# Patient Record
Sex: FEMALE | Race: OTHER | HISPANIC OR LATINO
[De-identification: names, ages, dates, MRNs, and addresses within clinical notes are randomized per-mention and may not be internally consistent; named-entity substitution may affect disease eponyms.]

---

## 2018-02-09 PROBLEM — Z00.00 ENCOUNTER FOR PREVENTIVE HEALTH EXAMINATION: Status: ACTIVE | Noted: 2018-02-09

## 2018-03-08 ENCOUNTER — APPOINTMENT (OUTPATIENT)
Dept: ENDOCRINOLOGY | Facility: CLINIC | Age: 63
End: 2018-03-08
Payer: MEDICAID

## 2018-03-08 VITALS
HEIGHT: 61 IN | HEART RATE: 75 BPM | BODY MASS INDEX: 25.86 KG/M2 | SYSTOLIC BLOOD PRESSURE: 124 MMHG | DIASTOLIC BLOOD PRESSURE: 66 MMHG | WEIGHT: 137 LBS

## 2018-03-08 DIAGNOSIS — M19.90 UNSPECIFIED OSTEOARTHRITIS, UNSPECIFIED SITE: ICD-10-CM

## 2018-03-08 DIAGNOSIS — N20.0 CALCULUS OF KIDNEY: ICD-10-CM

## 2018-03-08 DIAGNOSIS — Z80.9 FAMILY HISTORY OF MALIGNANT NEOPLASM, UNSPECIFIED: ICD-10-CM

## 2018-03-08 PROCEDURE — 36415 COLL VENOUS BLD VENIPUNCTURE: CPT

## 2018-03-08 PROCEDURE — 99214 OFFICE O/P EST MOD 30 MIN: CPT | Mod: 25

## 2018-03-15 ENCOUNTER — FORM ENCOUNTER (OUTPATIENT)
Age: 63
End: 2018-03-15

## 2018-03-16 ENCOUNTER — APPOINTMENT (OUTPATIENT)
Dept: ULTRASOUND IMAGING | Facility: HOSPITAL | Age: 63
End: 2018-03-16

## 2018-03-16 ENCOUNTER — OUTPATIENT (OUTPATIENT)
Dept: OUTPATIENT SERVICES | Facility: HOSPITAL | Age: 63
LOS: 1 days | End: 2018-03-16
Payer: COMMERCIAL

## 2018-03-16 PROCEDURE — 76536 US EXAM OF HEAD AND NECK: CPT | Mod: 26

## 2018-03-16 PROCEDURE — 76536 US EXAM OF HEAD AND NECK: CPT

## 2018-03-23 ENCOUNTER — OTHER (OUTPATIENT)
Age: 63
End: 2018-03-23

## 2018-03-23 LAB
ALBUMIN SERPL ELPH-MCNC: 3.9 G/DL
ALP BLD-CCNC: 95 U/L
ALT SERPL-CCNC: 13 U/L
ANION GAP SERPL CALC-SCNC: 17 MMOL/L
AST SERPL-CCNC: 18 U/L
BILIRUB SERPL-MCNC: 0.4 MG/DL
BUN SERPL-MCNC: 18 MG/DL
CALCIUM SERPL-MCNC: 10.1 MG/DL
CHLORIDE SERPL-SCNC: 102 MMOL/L
CHOLEST SERPL-MCNC: 165 MG/DL
CHOLEST/HDLC SERPL: 2.7 RATIO
CO2 SERPL-SCNC: 23 MMOL/L
CREAT SERPL-MCNC: 0.76 MG/DL
CREAT SPEC-SCNC: 73 MG/DL
GLUCOSE SERPL-MCNC: 87 MG/DL
HBA1C MFR BLD HPLC: 5.8 %
HDLC SERPL-MCNC: 62 MG/DL
LDLC SERPL CALC-MCNC: 90 MG/DL
MICROALBUMIN 24H UR DL<=1MG/L-MCNC: 0.4 MG/DL
MICROALBUMIN/CREAT 24H UR-RTO: 5 MG/G
POTASSIUM SERPL-SCNC: 4.2 MMOL/L
PROT SERPL-MCNC: 8.1 G/DL
SODIUM SERPL-SCNC: 142 MMOL/L
TRIGL SERPL-MCNC: 63 MG/DL
TSH SERPL-ACNC: 0.02 UIU/ML

## 2018-04-13 ENCOUNTER — LABORATORY RESULT (OUTPATIENT)
Age: 63
End: 2018-04-13

## 2018-04-19 ENCOUNTER — APPOINTMENT (OUTPATIENT)
Dept: ENDOCRINOLOGY | Facility: CLINIC | Age: 63
End: 2018-04-19
Payer: MEDICAID

## 2018-04-19 VITALS
WEIGHT: 135 LBS | SYSTOLIC BLOOD PRESSURE: 117 MMHG | HEIGHT: 61 IN | BODY MASS INDEX: 25.49 KG/M2 | DIASTOLIC BLOOD PRESSURE: 65 MMHG | HEART RATE: 89 BPM

## 2018-04-19 PROCEDURE — 99213 OFFICE O/P EST LOW 20 MIN: CPT

## 2018-05-01 LAB
ALBUMIN SERPL ELPH-MCNC: 4.3 G/DL
ALP BLD-CCNC: 93 U/L
ALT SERPL-CCNC: 12 U/L
ANION GAP SERPL CALC-SCNC: 16 MMOL/L
AST SERPL-CCNC: 18 U/L
BILIRUB SERPL-MCNC: 0.3 MG/DL
BUN SERPL-MCNC: 13 MG/DL
CALCIUM SERPL-MCNC: 10 MG/DL
CHLORIDE SERPL-SCNC: 104 MMOL/L
CO2 SERPL-SCNC: 24 MMOL/L
CREAT SERPL-MCNC: 0.62 MG/DL
GLUCOSE SERPL-MCNC: 115 MG/DL
POTASSIUM SERPL-SCNC: 4.4 MMOL/L
PROT SERPL-MCNC: 7.7 G/DL
SODIUM SERPL-SCNC: 144 MMOL/L
T3FREE SERPL-MCNC: 5.79 PG/ML
T4 FREE SERPL-MCNC: 1.6 NG/DL
TSH RECEPTOR AB: 1.11 IU/L
TSH SERPL-ACNC: 0.01 UIU/ML

## 2018-05-09 ENCOUNTER — OTHER (OUTPATIENT)
Age: 63
End: 2018-05-09

## 2018-05-29 ENCOUNTER — FORM ENCOUNTER (OUTPATIENT)
Age: 63
End: 2018-05-29

## 2018-05-30 ENCOUNTER — OUTPATIENT (OUTPATIENT)
Dept: OUTPATIENT SERVICES | Facility: HOSPITAL | Age: 63
LOS: 1 days | End: 2018-05-30

## 2018-05-30 ENCOUNTER — APPOINTMENT (OUTPATIENT)
Dept: ULTRASOUND IMAGING | Facility: HOSPITAL | Age: 63
End: 2018-05-30
Payer: MEDICAID

## 2018-05-30 ENCOUNTER — FORM ENCOUNTER (OUTPATIENT)
Age: 63
End: 2018-05-30

## 2018-05-30 PROCEDURE — 76536 US EXAM OF HEAD AND NECK: CPT | Mod: 26

## 2018-05-31 PROCEDURE — 76536 US EXAM OF HEAD AND NECK: CPT

## 2018-05-31 PROCEDURE — A9516: CPT

## 2018-05-31 PROCEDURE — 78014 THYROID IMAGING W/BLOOD FLOW: CPT

## 2018-05-31 PROCEDURE — 78014 THYROID IMAGING W/BLOOD FLOW: CPT | Mod: 26

## 2018-06-26 ENCOUNTER — APPOINTMENT (OUTPATIENT)
Dept: ENDOCRINOLOGY | Facility: CLINIC | Age: 63
End: 2018-06-26
Payer: MEDICAID

## 2018-06-26 VITALS
SYSTOLIC BLOOD PRESSURE: 111 MMHG | BODY MASS INDEX: 29.45 KG/M2 | WEIGHT: 150 LBS | HEIGHT: 60 IN | HEART RATE: 71 BPM | DIASTOLIC BLOOD PRESSURE: 60 MMHG

## 2018-06-26 PROCEDURE — 99215 OFFICE O/P EST HI 40 MIN: CPT

## 2018-07-13 LAB
25(OH)D3 SERPL-MCNC: 21.1 NG/ML
ALBUMIN SERPL ELPH-MCNC: 4.3 G/DL
ALP BLD-CCNC: 109 U/L
ALT SERPL-CCNC: 12 U/L
ANION GAP SERPL CALC-SCNC: 13 MMOL/L
AST SERPL-CCNC: 14 U/L
BILIRUB SERPL-MCNC: 0.2 MG/DL
BUN SERPL-MCNC: 16 MG/DL
CALCIUM SERPL-MCNC: 9.4 MG/DL
CALCIUM SERPL-MCNC: 9.5 MG/DL
CHLORIDE SERPL-SCNC: 104 MMOL/L
CHOLEST SERPL-MCNC: 206 MG/DL
CHOLEST/HDLC SERPL: 2.5 RATIO
CO2 SERPL-SCNC: 25 MMOL/L
CREAT SERPL-MCNC: 0.76 MG/DL
GLUCOSE SERPL-MCNC: 88 MG/DL
HDLC SERPL-MCNC: 84 MG/DL
LDLC SERPL CALC-MCNC: 98 MG/DL
PARATHYROID HORMONE INTACT: 67 PG/ML
POTASSIUM SERPL-SCNC: 4.7 MMOL/L
PROT SERPL-MCNC: 7.4 G/DL
SODIUM SERPL-SCNC: 142 MMOL/L
T3FREE SERPL-MCNC: 3.74 PG/ML
T4 FREE SERPL-MCNC: 1.3 NG/DL
TRIGL SERPL-MCNC: 122 MG/DL
TSH SERPL-ACNC: 0.01 UIU/ML

## 2018-08-27 ENCOUNTER — APPOINTMENT (OUTPATIENT)
Dept: ENDOCRINOLOGY | Facility: CLINIC | Age: 63
End: 2018-08-27
Payer: MEDICAID

## 2018-08-27 VITALS — BODY MASS INDEX: 28.71 KG/M2 | WEIGHT: 147 LBS | DIASTOLIC BLOOD PRESSURE: 55 MMHG | SYSTOLIC BLOOD PRESSURE: 105 MMHG

## 2018-08-27 PROCEDURE — 99214 OFFICE O/P EST MOD 30 MIN: CPT

## 2018-12-17 ENCOUNTER — APPOINTMENT (OUTPATIENT)
Dept: ENDOCRINOLOGY | Facility: CLINIC | Age: 63
End: 2018-12-17
Payer: MEDICAID

## 2018-12-17 VITALS
WEIGHT: 152 LBS | DIASTOLIC BLOOD PRESSURE: 83 MMHG | HEIGHT: 60 IN | HEART RATE: 74 BPM | SYSTOLIC BLOOD PRESSURE: 131 MMHG | BODY MASS INDEX: 29.84 KG/M2

## 2018-12-17 PROCEDURE — 99215 OFFICE O/P EST HI 40 MIN: CPT

## 2019-01-28 LAB
25(OH)D3 SERPL-MCNC: 68.7 NG/ML
HBA1C MFR BLD HPLC: 6.3 %
T3FREE SERPL-MCNC: 3.15 PG/ML
T4 FREE SERPL-MCNC: 1.2 NG/DL
TSH SERPL-ACNC: 2.37 UIU/ML

## 2019-05-20 ENCOUNTER — APPOINTMENT (OUTPATIENT)
Dept: ENDOCRINOLOGY | Facility: CLINIC | Age: 64
End: 2019-05-20
Payer: MEDICAID

## 2019-05-20 VITALS
DIASTOLIC BLOOD PRESSURE: 73 MMHG | HEART RATE: 85 BPM | SYSTOLIC BLOOD PRESSURE: 136 MMHG | BODY MASS INDEX: 29.3 KG/M2 | WEIGHT: 150 LBS

## 2019-05-20 DIAGNOSIS — E55.9 VITAMIN D DEFICIENCY, UNSPECIFIED: ICD-10-CM

## 2019-05-20 PROCEDURE — 99214 OFFICE O/P EST MOD 30 MIN: CPT

## 2019-05-24 NOTE — END OF VISIT
[>50% of Time Spent on Counseling for ____] : Greater than 50% of the encounter time was spent on counseling for [unfilled] [FreeTextEntry3] : All medical record entries made by the scribe were at my, Dr. Marc Uribe's direction and personally dictated by me on 05/20/2019. I have reviewed the chart and agree that the record accurately reflects my personal performance of the history, physical exam, assessment and plan. I have also personally directed, reviewed, and agreed with the chart.\par \par  [Time Spent: ___ minutes] : I have spent [unfilled] minutes of face to face time with the patient

## 2019-05-24 NOTE — REVIEW OF SYSTEMS
[Recent Weight Gain (___ Lbs)] : recent [unfilled] ~Ulb weight gain [As Noted in HPI] : as noted in HPI [Negative] : Endocrine [Polyuria] : no polyuria [Nocturia] : no nocturia [FreeTextEntry8] : Denies osmotic diuresis [de-identified] : denies pins and needle sensation in lower extremities

## 2019-05-24 NOTE — ADDENDUM
[FreeTextEntry1] : I, Gladys Patton, acted solely as a scribe for Dr. Marc Uribe on 05/20/2019.\par I, Pam Arango, acted solely as a scribe for Dr. Marc Uribe on this date. 05/20/2019.\par

## 2019-05-24 NOTE — ASSESSMENT
[Carbohydrate Consistent Diet] : carbohydrate consistent diet [Importance of Diet and Exercise] : importance of diet and exercise to improve glycemic control, achieve weight loss and improve cardiovascular health [FreeTextEntry1] : 64 y/o F pt with:\par \par 1. Hx of pre-DM, with escalating A1c:\par Pt has a sedentary lifestyle, and continues to eat large food portions.\par \par 2. Hx of increased iPTH, with normal ca levels and no Hx of CKD:\par Pt has no FHx of hypercalcemia disorder and has Vitamin D insufficiency. \par Will send labs today to reassess increase levels of i PTH\par \par 3. Hx of thyroid nodules:\par Thyroid US shows inferior pole of L Thyroid with 0.6 cm nodule, described in GORDON uptake and scan as probable cold nodule. Will send for TFTs and consider a FNA biopsy.\par \par Return in 2 weeks. \par

## 2019-05-24 NOTE — HISTORY OF PRESENT ILLNESS
[FreeTextEntry1] : 7/31/13 A1c 6.6% \par 3/2018  TSH 0.02\par 4/13/18  tsh < 0.01, t4 free 1.8, free t3 6.83\par 4/19/18 TSH r ab 1.1, Free T4 1.6, Free T3 5.79 (1.8-4.6)\par 5/30/18 thyroid u/s: Left lobe : Inferior pole: 0.6 x 0.6 x 0.6 cm dense peripheral calcification \par 5/31/18  [facility]ON uptake and scan : 26% at 24 hours- considered as cold nodule, hypothyroidism \par 6/26/18 TSH 0.01, Free T4 1.3, Free T3 3.74, Vit D 25-OH 21.1, iPTH 67, Ca 9.5\par 1/12/19  A1c 6.3%, Vit D-25-OH 68.7, TSH 2.37, Free T4 1.2, Free T3 3.15, Cholesterol 206,  LDL-c 98 \par \par 62 y/o F pt, /73, BMI 29.3, with Hx of increased iPTH with normal calcium (since 7/2012), Hx of thyroid incidentaloma 0.6 cm (dx in 4/2013), thyroid nodules (dx ~ in 2014), and pre-DM. \par Other PMHx: osteoporosis w/ increased  A1c (in 7/2012), Vit D insufficiency\par PSHx: 2 kidney stone surgeries (in 2007)\par No FHx of hypercalcemia disorder\par \par Today pt presents for endocrine f/u, feeling well with no major physical complaints. Pt is asymptomatic. She notes she gained weight which she relates to her consumption of excessive calories. \par Pt denies osmotic diuresis, nocturia, polyuria, and pins and needle sensation in lower extremities.\par \par Current Meds: Ergocalciferol, Vit D3

## 2019-05-24 NOTE — PHYSICAL EXAM
[Alert] : alert [Normal Sclera/Conjunctiva] : normal sclera/conjunctiva [Normal Oropharynx] : the oropharynx was normal [No Respiratory Distress] : no respiratory distress [Clear to Auscultation] : lungs were clear to auscultation bilaterally [Pedal Pulses Normal] : the pedal pulses are present [No Edema] : there was no peripheral edema [Normal Bowel Sounds] : normal bowel sounds [Spine Straight] : spine straight [No Stigmata of Cushings Syndrome] : no stigmata of cushings syndrome [Normal Gait] : normal gait [Normal Strength/Tone] : muscle strength and tone were normal [No Rash] : no rash [Normal Reflexes] : deep tendon reflexes were 2+ and symmetric [Oriented x3] : oriented to person, place, and time [Normal Outer Ear/Nose] : the ears and nose were normal in appearance [Acanthosis Nigricans] : no acanthosis nigricans [de-identified] : R thyroid with nodularities [de-identified] : sinus normal

## 2019-07-01 ENCOUNTER — APPOINTMENT (OUTPATIENT)
Dept: ENDOCRINOLOGY | Facility: CLINIC | Age: 64
End: 2019-07-01
Payer: MEDICAID

## 2019-07-01 VITALS
DIASTOLIC BLOOD PRESSURE: 74 MMHG | BODY MASS INDEX: 29.1 KG/M2 | SYSTOLIC BLOOD PRESSURE: 125 MMHG | WEIGHT: 149 LBS | HEART RATE: 76 BPM

## 2019-07-01 DIAGNOSIS — R42 DIZZINESS AND GIDDINESS: ICD-10-CM

## 2019-07-01 LAB — GLUCOSE BLDC GLUCOMTR-MCNC: 123

## 2019-07-01 PROCEDURE — 99214 OFFICE O/P EST MOD 30 MIN: CPT | Mod: 25

## 2019-07-01 PROCEDURE — 82962 GLUCOSE BLOOD TEST: CPT

## 2019-07-01 RX ORDER — ERGOCALCIFEROL 1.25 MG/1
1.25 MG CAPSULE ORAL
Qty: 2 | Refills: 5 | Status: ACTIVE | COMMUNITY
Start: 2018-12-17 | End: 1900-01-01

## 2019-07-03 NOTE — ASSESSMENT
[Carbohydrate Consistent Diet] : carbohydrate consistent diet [Importance of Diet and Exercise] : importance of diet and exercise to improve glycemic control, achieve weight loss and improve cardiovascular health [FreeTextEntry1] : 62 y/o F pt with:\par 1. Hx of increase iPTH, with normal Ca levels:\par Likely 2/2 an early primary hyperparathyroidism vs secondary. Recommend pt continue Vit D supplementation.\par \par 2. Hx of thyroid nodules (dx in 2014):\par On 6/23/19 Thyroid US: L thyroid with a peripherally calcified nodule in L lower pole that measures 6 x 5 x 6mm.\par Refer pt for biopsy. \par \par 3. Hx of pre-DM, recent A1c of 6.3%:\par Today POCT was 123. Pt is not improving clinically, and is becoming sedentary 2/2 social issues. \par Pt declined Metformin.\par \par 4. Hx of low TSH levels:\par Pt's low TSH levels has normalized in 1/2019. Nuclear medicine done on 5/31/18, uptake and scan at 24 hours was 26%, homogeneous with significantly greater activity in L more than the R. Hashimoto thyroiditis? Will continue to monitor TFTs.\par  \par Return in 4 months.

## 2019-07-03 NOTE — ADDENDUM
[FreeTextEntry1] : I, Jasmeetcylde Arango, acted solely as a scribe for Dr. Marc Uribe on this date. 07/01/2019.\par

## 2019-07-03 NOTE — HISTORY OF PRESENT ILLNESS
[FreeTextEntry1] : 7/31/13 A1c 6.6% \par 3/2018  TSH 0.02\par 4/13/18  tsh < 0.01, t4 free 1.8, free t3 6.83\par 4/19/18 TSH r ab 1.1, Free T4 1.6, Free T3 5.79 (1.8-4.6)\par 5/30/18 thyroid u/s: Left lobe : Inferior pole: 0.6 x 0.6 x 0.6 cm dense peripheral calcification \par 5/31/18  [facility] uptake and scan : 26% at 24 hours- considered as cold nodule, hypothyroidism \par 6/26/18 TSH 0.01, Free T4 1.3, Free T3 3.74, Vit D 25-OH 21.1, iPTH 67, Ca 9.5\par 1/12/19  A1c 6.3%, Vit D 25-OH 68.7, TSH 2.37, Free T4 1.2, Free T3 3.15, Cholesterol 206,  LDL-c 98 \par 6/23/19 Thyroid US: L thyroid with a peripherally calcified nodule in L lower pole that measures 6 x 5 x 6mm.\par 6/25/19 s. creat 0.64, TSH 0.046, Vit D 25-OH 23.6, Free T3 3.3, Free T4 1.24, iPTH 74, Ca 9.7, Vit D 1-25-OH 46.9, \par \par 62 y/o F pt, /74, BMI 29.1, with Hx of increased iPTH with normal calcium (since 7/2012), Hx of thyroid incidentaloma 0.6 cm (dx in 4/2013), thyroid nodules (dx ~ in 2014), and pre-DM. \par Other PMHx: osteoporosis w/ increased  A1c (in 7/2012), Vit D insufficiency\par PSHx: 2 kidney stone surgeries (in 2007)\par No FHx of hypercalcemia disorder\par \par Today pt presents for endocrine f/u, feeling unwell with c/o positional vertigo x couple of weeks. Pt also c/o social issues, her  was diagnosed with CA.\par \par Current Meds: Ergocalciferol, Vit D3

## 2019-07-03 NOTE — PHYSICAL EXAM
[Alert] : alert [Normal Sclera/Conjunctiva] : normal sclera/conjunctiva [Normal Outer Ear/Nose] : the ears and nose were normal in appearance [Normal Oropharynx] : the oropharynx was normal [No Respiratory Distress] : no respiratory distress [Clear to Auscultation] : lungs were clear to auscultation bilaterally [Pedal Pulses Normal] : the pedal pulses are present [No Edema] : there was no peripheral edema [Normal Bowel Sounds] : normal bowel sounds [Spine Straight] : spine straight [No Stigmata of Cushings Syndrome] : no stigmata of cushings syndrome [Normal Gait] : normal gait [Normal Strength/Tone] : muscle strength and tone were normal [No Rash] : no rash [Normal Reflexes] : deep tendon reflexes were 2+ and symmetric [Oriented x3] : oriented to person, place, and time [Normal Rate] : heart rate was normal  [Normal S1, S2] : normal S1 and S2 [Acanthosis Nigricans] : no acanthosis nigricans [de-identified] : R and L thyroid lobes with nodularities

## 2019-07-03 NOTE — END OF VISIT
[FreeTextEntry3] : All medical record entries made by the Scribe were at my, Dr. Marc Uribe, direction and personally dictated by me on 07/01/2019. I have reviewed the chart and agree that the record accurately reflects my personal performance of the history, physical exam, assessment and plan. I have also personally directed, reviewed and agreed with the chart.\par  [>50% of Time Spent on Counseling for ____] : Greater than 50% of the encounter time was spent on counseling for [unfilled] [Time Spent: ___ minutes] : I have spent [unfilled] minutes of face to face time with the patient

## 2019-11-05 ENCOUNTER — APPOINTMENT (OUTPATIENT)
Dept: ENDOCRINOLOGY | Facility: CLINIC | Age: 64
End: 2019-11-05
Payer: MEDICAID

## 2019-11-05 VITALS
HEART RATE: 71 BPM | DIASTOLIC BLOOD PRESSURE: 78 MMHG | BODY MASS INDEX: 29.49 KG/M2 | SYSTOLIC BLOOD PRESSURE: 137 MMHG | WEIGHT: 151 LBS

## 2019-11-05 VITALS
HEIGHT: 63 IN | SYSTOLIC BLOOD PRESSURE: 136 MMHG | BODY MASS INDEX: 26.75 KG/M2 | DIASTOLIC BLOOD PRESSURE: 79 MMHG | HEART RATE: 79 BPM | WEIGHT: 151 LBS

## 2019-11-05 DIAGNOSIS — E05.90 THYROTOXICOSIS, UNSPECIFIED W/OUT THYROTOXIC CRISIS OR STORM: ICD-10-CM

## 2019-11-05 PROCEDURE — 99214 OFFICE O/P EST MOD 30 MIN: CPT | Mod: 25

## 2019-11-05 PROCEDURE — 82962 GLUCOSE BLOOD TEST: CPT

## 2019-11-07 PROBLEM — E05.90 HYPERTHYROIDISM: Status: ACTIVE | Noted: 2018-04-19

## 2019-11-07 NOTE — PHYSICAL EXAM
[Alert] : alert [Normal Sclera/Conjunctiva] : normal sclera/conjunctiva [Normal Outer Ear/Nose] : the ears and nose were normal in appearance [Normal Oropharynx] : the oropharynx was normal [No Respiratory Distress] : no respiratory distress [Clear to Auscultation] : lungs were clear to auscultation bilaterally [Normal Rate] : heart rate was normal  [Normal S1, S2] : normal S1 and S2 [Pedal Pulses Normal] : the pedal pulses are present [No Edema] : there was no peripheral edema [Normal Bowel Sounds] : normal bowel sounds [Spine Straight] : spine straight [No Stigmata of Cushings Syndrome] : no stigmata of cushings syndrome [Normal Gait] : normal gait [Normal Strength/Tone] : muscle strength and tone were normal [No Rash] : no rash [Normal Reflexes] : deep tendon reflexes were 2+ and symmetric [Oriented x3] : oriented to person, place, and time [Acanthosis Nigricans] : no acanthosis nigricans [de-identified] : R and L thyroid lobes with nodularities

## 2019-11-07 NOTE — ASSESSMENT
[Carbohydrate Consistent Diet] : carbohydrate consistent diet [Importance of Diet and Exercise] : importance of diet and exercise to improve glycemic control, achieve weight loss and improve cardiovascular health [FreeTextEntry1] : 63 y/o F pt with:\par 1. Hx of thyroid nodule with low TSH levels:\par Pt is clinically euthyroid. Will send for TFTs and thyroid US for 4/2020 (on 6/2019 Thyroid US shows a nodular area at the R upper pole which measures 6mm and a peripherally calcified nodule in L lower pole that measures 6mm).\par \par 2. Hx of pre-DM:\par Pt continues at an increased risk of developing DM. \par Recommend pt initiate 850 mg of Metformin QD.\par  \par Return in 4/2020.

## 2019-11-07 NOTE — END OF VISIT
[FreeTextEntry3] : All medical record entries made by the Scribe were at my, Dr. Marc Uribe, direction and personally dictated by me on 11/05/2019. I have reviewed the chart and agree that the record accurately reflects my personal performance of the history, physical exam, assessment and plan. I have also personally directed, reviewed and agreed with the chart.  [>50% of Time Spent on Counseling for ____] : Greater than 50% of the encounter time was spent on counseling for [unfilled] [Time Spent: ___ minutes] : I have spent [unfilled] minutes of face to face time with the patient

## 2019-11-07 NOTE — ADDENDUM
[FreeTextEntry1] : I, Pam Arango, acted solely as a scribe for Dr. Marc Uribe on this date. 11/05/2019.

## 2019-11-07 NOTE — HISTORY OF PRESENT ILLNESS
[FreeTextEntry1] : 7/31/13 A1c 6.6% \par 3/2018  TSH 0.02\par 4/13/18  tsh < 0.01, t4 free 1.8, free t3 6.83\par 4/19/18 TSH rab 1.1, Free T4 1.6, Free T3 5.79 (1.8-4.6)\par 5/30/18 thyroid u/s: Left lobe : Inferior pole: 0.6 x 0.6 x 0.6 cm dense peripheral calcification \par 5/31/18  [facility] uptake and scan : 26% at 24 hours- considered as cold nodule, hypothyroidism \par 6/26/18 TSH 0.01, Free T4 1.3, Free T3 3.74, Vit D 25-OH 21.1, iPTH 67, Ca 9.5\par 1/12/19  A1c 6.3%, Vit D 25-OH 68.7, TSH 2.37, Free T4 1.2, Free T3 3.15, Cholesterol 206,  LDL-c 98 \par 6/23/19 Thyroid US: L thyroid with a peripherally calcified nodule in L lower pole that measures 6 x 5 x 6mm.\par 6/25/19 s. creat 0.64, TSH 0.046, Vit D 25-OH 23.6, Free T3 3.3, Free T4 1.24, iPTH 74, Ca 9.7, Vit D 1-25-OH 46.9, \par \par 63 y/o F pt, /78, BMI 29.49, with Hx of increased iPTH with normal calcium (since 7/2012), Hx of thyroid incidentaloma 0.6 cm (dx in 4/2013), thyroid nodules (dx ~ in 2014), and pre-DM. \par Other PMHx: osteoporosis w/ increased  A1c (in 7/2012), Vit D insufficiency\par PSHx: 2 kidney stone surgeries (in 2007)\par No FHx of hypercalcemia disorder\par \par 7/1/19\par Today pt presents for endocrine f/u, feeling unwell with c/o positional vertigo x couple of weeks. Pt also c/o social issues, her  was diagnosed with CA.\par \par 11/4/19\par Today pt presents for endocrine f/u with POCT 116, feeling great with c/o occasional joint pains. \par Denies palpitations and symptoms of osmotic diuresis. \par \par Current Meds: Ergocalciferol, Vit D3

## 2019-11-07 NOTE — REVIEW OF SYSTEMS
[Negative] : Endocrine [As Noted in HPI] : as noted in HPI [Joint Pain] : joint pain [Palpitations] : no palpitations [de-identified] : denies symptoms of osmotic diuresis

## 2019-11-12 ENCOUNTER — APPOINTMENT (OUTPATIENT)
Dept: ENDOCRINOLOGY | Facility: CLINIC | Age: 64
End: 2019-11-12

## 2019-12-11 LAB
ESTIMATED AVERAGE GLUCOSE: 134 MG/DL
GLUCOSE BLDC GLUCOMTR-MCNC: 113
GLUCOSE BLDC GLUCOMTR-MCNC: 116
HBA1C MFR BLD HPLC: 6.3 %
T3FREE SERPL-MCNC: 2.86 PG/ML
T4 FREE SERPL-MCNC: 1 NG/DL
THYROGLOB AB SERPL-ACNC: <20 IU/ML
THYROPEROXIDASE AB SERPL IA-ACNC: 104 IU/ML
TSH SERPL-ACNC: 1.53 UIU/ML

## 2020-04-15 ENCOUNTER — APPOINTMENT (OUTPATIENT)
Dept: ENDOCRINOLOGY | Facility: CLINIC | Age: 65
End: 2020-04-15

## 2023-07-10 ENCOUNTER — OUTPATIENT (OUTPATIENT)
Dept: OUTPATIENT SERVICES | Facility: HOSPITAL | Age: 68
LOS: 1 days | End: 2023-07-10
Payer: MEDICAID

## 2023-07-10 ENCOUNTER — APPOINTMENT (OUTPATIENT)
Dept: MAMMOGRAPHY | Facility: IMAGING CENTER | Age: 68
End: 2023-07-10
Payer: MEDICAID

## 2023-07-10 ENCOUNTER — APPOINTMENT (OUTPATIENT)
Dept: ULTRASOUND IMAGING | Facility: IMAGING CENTER | Age: 68
End: 2023-07-10
Payer: MEDICAID

## 2023-07-10 DIAGNOSIS — Z00.8 ENCOUNTER FOR OTHER GENERAL EXAMINATION: ICD-10-CM

## 2023-07-10 PROCEDURE — G0279: CPT | Mod: 26

## 2023-07-10 PROCEDURE — 77066 DX MAMMO INCL CAD BI: CPT

## 2023-07-10 PROCEDURE — 76642 ULTRASOUND BREAST LIMITED: CPT | Mod: 26,LT

## 2023-07-10 PROCEDURE — 76642 ULTRASOUND BREAST LIMITED: CPT

## 2023-07-10 PROCEDURE — G0279: CPT

## 2023-07-10 PROCEDURE — 77066 DX MAMMO INCL CAD BI: CPT | Mod: 26

## 2023-07-18 ENCOUNTER — APPOINTMENT (OUTPATIENT)
Dept: ENDOCRINOLOGY | Facility: CLINIC | Age: 68
End: 2023-07-18
Payer: MEDICAID

## 2023-07-18 VITALS
HEART RATE: 75 BPM | DIASTOLIC BLOOD PRESSURE: 63 MMHG | HEIGHT: 63 IN | SYSTOLIC BLOOD PRESSURE: 136 MMHG | BODY MASS INDEX: 25.87 KG/M2 | WEIGHT: 146 LBS

## 2023-07-18 PROCEDURE — 99205 OFFICE O/P NEW HI 60 MIN: CPT | Mod: 25

## 2023-07-18 PROCEDURE — 82962 GLUCOSE BLOOD TEST: CPT

## 2023-07-18 NOTE — REASON FOR VISIT
[Initial Evaluation] : an initial evaluation [Thyroid nodule/ MNG] : thyroid nodule/ MNG [Other___] : [unfilled] [Family Member] : family member

## 2023-07-21 LAB
25(OH)D3 SERPL-MCNC: 29.3 NG/ML
ALBUMIN SERPL ELPH-MCNC: 4.3 G/DL
ALBUMIN SERPL ELPH-MCNC: 4.4 G/DL
ALP BLD-CCNC: 131 U/L
ALP BLD-CCNC: 132 U/L
ALT SERPL-CCNC: 17 U/L
ALT SERPL-CCNC: 19 U/L
ANION GAP SERPL CALC-SCNC: 11 MMOL/L
ANION GAP SERPL CALC-SCNC: 12 MMOL/L
APO B SERPL-MCNC: 92 MG/DL
AST SERPL-CCNC: 22 U/L
AST SERPL-CCNC: 25 U/L
BILIRUB SERPL-MCNC: 0.3 MG/DL
BILIRUB SERPL-MCNC: 0.3 MG/DL
BUN SERPL-MCNC: 12 MG/DL
BUN SERPL-MCNC: 12 MG/DL
CALCIUM SERPL-MCNC: 9.5 MG/DL
CALCIUM SERPL-MCNC: 9.5 MG/DL
CALCIUM SERPL-MCNC: 9.6 MG/DL
CHLORIDE SERPL-SCNC: 104 MMOL/L
CHLORIDE SERPL-SCNC: 105 MMOL/L
CHOLEST SERPL-MCNC: 214 MG/DL
CO2 SERPL-SCNC: 24 MMOL/L
CO2 SERPL-SCNC: 24 MMOL/L
CREAT SERPL-MCNC: 0.6 MG/DL
CREAT SERPL-MCNC: 0.61 MG/DL
EGFR: 98 ML/MIN/1.73M2
EGFR: 98 ML/MIN/1.73M2
ESTIMATED AVERAGE GLUCOSE: 177 MG/DL
GLUCOSE BLDC GLUCOMTR-MCNC: 155
HBA1C MFR BLD HPLC: 7.8 %
HDLC SERPL-MCNC: 77 MG/DL
LDLC SERPL CALC-MCNC: 106 MG/DL
NONHDLC SERPL-MCNC: 137 MG/DL
PARATHYROID HORMONE INTACT: 62 PG/ML
POTASSIUM SERPL-SCNC: 4.4 MMOL/L
POTASSIUM SERPL-SCNC: 4.5 MMOL/L
PROT SERPL-MCNC: 7 G/DL
PROT SERPL-MCNC: 7.1 G/DL
SODIUM SERPL-SCNC: 140 MMOL/L
SODIUM SERPL-SCNC: 141 MMOL/L
T4 FREE SERPL-MCNC: 1.2 NG/DL
THYROGLOB AB SERPL-ACNC: <20 IU/ML
THYROPEROXIDASE AB SERPL IA-ACNC: 319 IU/ML
TRIGL SERPL-MCNC: 184 MG/DL
TSH SERPL-ACNC: 0.02 UIU/ML

## 2023-07-21 NOTE — END OF VISIT
[FreeTextEntry3] : All medical record entries made by the Scribe were at my, Dr. Marc Uribe, direction and personally dictated by me on 07/18/2023. I have reviewed the chart and agree that the record accurately reflects my personal performance of the history, physical exam, assessment and plan. I have also personally, directed, reviewed and agreed with the chart  [Time Spent: ___ minutes] : I have spent [unfilled] minutes of time on the encounter.

## 2023-07-21 NOTE — ADDENDUM
[FreeTextEntry1] : I, Reina Paniagua, acted solely as a scribe for Dr. Marc Uribe on this date 07/18/2023

## 2023-07-21 NOTE — HISTORY OF PRESENT ILLNESS
[FreeTextEntry1] : 68 year old F pt, with Hx of Thyroid Nodule noted , pre-DM, and increased iPTH, self- referred presents today to establish endocrine care. \par Other PMHx:  Kidney Stones, Early menopause \par PSHx: Cholecystomy, tubal ligation, , \par FHx: Mother: T2DM, Father: Stomach Cancer, Siblings: 9 Sisters and Brother: No thyroid disorders, no thyroid Cancer. \par SHx: No etOH, Non smoker \par Menarche age 16, Menopause age 29. 4 kids youngest is 41   \par \par 2023 \par Pt  Is here for thyroid evaluation accompanied by her son. She states she needs a checkup as it has been 4 yrs since her last visit. Pt endorses feeling more tired than usual for the past few months. She was dx with sciatica that limited her movement. Pt has seen urogynecologist for urinary incontinence. \par \par Current Medications: None

## 2023-07-24 ENCOUNTER — NON-APPOINTMENT (OUTPATIENT)
Age: 68
End: 2023-07-24

## 2023-07-24 LAB — T3 SERPL-MCNC: 175 NG/DL

## 2023-08-15 ENCOUNTER — APPOINTMENT (OUTPATIENT)
Dept: ULTRASOUND IMAGING | Facility: IMAGING CENTER | Age: 68
End: 2023-08-15
Payer: MEDICAID

## 2023-08-15 ENCOUNTER — OUTPATIENT (OUTPATIENT)
Dept: OUTPATIENT SERVICES | Facility: HOSPITAL | Age: 68
LOS: 1 days | End: 2023-08-15
Payer: MEDICAID

## 2023-08-15 DIAGNOSIS — E04.1 NONTOXIC SINGLE THYROID NODULE: ICD-10-CM

## 2023-08-15 PROCEDURE — 76536 US EXAM OF HEAD AND NECK: CPT | Mod: 26

## 2023-08-15 PROCEDURE — 76536 US EXAM OF HEAD AND NECK: CPT

## 2023-08-25 LAB — TSH RECEPTOR AB: <1.1 IU/L

## 2023-08-29 ENCOUNTER — APPOINTMENT (OUTPATIENT)
Dept: ENDOCRINOLOGY | Facility: CLINIC | Age: 68
End: 2023-08-29
Payer: MEDICAID

## 2023-08-29 DIAGNOSIS — E78.00 PURE HYPERCHOLESTEROLEMIA, UNSPECIFIED: ICD-10-CM

## 2023-08-29 DIAGNOSIS — E34.9 ENDOCRINE DISORDER, UNSPECIFIED: ICD-10-CM

## 2023-08-29 PROCEDURE — 99214 OFFICE O/P EST MOD 30 MIN: CPT | Mod: 95

## 2023-08-30 NOTE — ADDENDUM
[FreeTextEntry1] : I, Reina Paniagua, acted solely as a scribe for Dr. Marc Uribe on this date 08/29/2023

## 2023-08-30 NOTE — END OF VISIT
[FreeTextEntry3] : All medical record entries made by the Scribe were at my, Dr. Marc Uribe, direction and personally dictated by me on 08/29/2023. I have reviewed the chart and agree that the record accurately reflects my personal performance of the history, physical exam, assessment and plan. I have also personally, directed, reviewed and agreed with the chart  [Time Spent: ___ minutes] : I have spent [unfilled] minutes of time on the encounter.

## 2023-08-30 NOTE — HISTORY OF PRESENT ILLNESS
[Home] : at home, [unfilled] , at the time of the visit. [Medical Office: ([facility])___] : at the medical office located in  [FreeTextEntry1] : 68 year old F pt, with Hx of Thyroid Nodule noted , pre-DM, and increased iPTH, self- referred presents today to establish endocrine care.  Other PMHx:  Kidney Stones, Early menopause  PSHx: Cholecystomy, tubal ligation, ,  FHx: Mother: T2DM, Father: Stomach Cancer, Siblings: 9 Sisters and Brother: No thyroid disorders, no thyroid Cancer.  SHx: No etOH, Non smoker  Menarche age 16, Menopause age 29. 4 kids youngest is 41     2023  Pt  Is here for thyroid evaluation accompanied by her son. She states she needs a checkup as it has been 4 yrs since her last visit. Pt endorses feeling more tired than usual for the past few months. She was dx with sciatica that limited her movement. Pt has seen urogynecologist for urinary incontinence.   2023 Pt did not have any questions prior to the initiation of the telehealth visit.  She presents today for lab reports and recommendations.  Pt feels well with no complaints.   Current Medications: None    Medication modified/added this visit: Multivitamins.

## 2024-06-20 ENCOUNTER — APPOINTMENT (OUTPATIENT)
Dept: ENDOCRINOLOGY | Facility: CLINIC | Age: 69
End: 2024-06-20
Payer: MEDICARE

## 2024-06-20 VITALS
WEIGHT: 142 LBS | SYSTOLIC BLOOD PRESSURE: 143 MMHG | BODY MASS INDEX: 25.16 KG/M2 | HEIGHT: 63 IN | DIASTOLIC BLOOD PRESSURE: 64 MMHG | HEART RATE: 76 BPM

## 2024-06-20 DIAGNOSIS — E21.5 DISORDER OF PARATHYROID GLAND, UNSPECIFIED: ICD-10-CM

## 2024-06-20 DIAGNOSIS — Z78.0 ASYMPTOMATIC MENOPAUSAL STATE: ICD-10-CM

## 2024-06-20 DIAGNOSIS — R73.03 PREDIABETES.: ICD-10-CM

## 2024-06-20 DIAGNOSIS — E11.65 TYPE 2 DIABETES MELLITUS WITH HYPERGLYCEMIA: ICD-10-CM

## 2024-06-20 DIAGNOSIS — R79.89 OTHER SPECIFIED ABNORMAL FINDINGS OF BLOOD CHEMISTRY: ICD-10-CM

## 2024-06-20 DIAGNOSIS — E04.1 NONTOXIC SINGLE THYROID NODULE: ICD-10-CM

## 2024-06-20 LAB — GLUCOSE BLDC GLUCOMTR-MCNC: 105

## 2024-06-20 PROCEDURE — 99205 OFFICE O/P NEW HI 60 MIN: CPT | Mod: 25

## 2024-06-20 PROCEDURE — 82962 GLUCOSE BLOOD TEST: CPT

## 2024-06-20 PROCEDURE — 36415 COLL VENOUS BLD VENIPUNCTURE: CPT

## 2024-06-20 RX ORDER — MELATONIN 10 MG
600-20 TABLET, SUBLINGUAL SUBLINGUAL
Qty: 90 | Refills: 0 | Status: ACTIVE | COMMUNITY
Start: 2024-06-20 | End: 1900-01-01

## 2024-06-20 RX ORDER — METFORMIN HYDROCHLORIDE 1000 MG/1
1000 TABLET, COATED ORAL
Qty: 180 | Refills: 3 | Status: DISCONTINUED | COMMUNITY
Start: 2023-08-29 | End: 2024-06-20

## 2024-06-20 RX ORDER — ATORVASTATIN CALCIUM 20 MG/1
20 TABLET, FILM COATED ORAL
Qty: 90 | Refills: 3 | Status: ACTIVE | COMMUNITY
Start: 2023-08-29 | End: 1900-01-01

## 2024-06-20 RX ORDER — METFORMIN HYDROCHLORIDE 850 MG/1
850 TABLET, COATED ORAL TWICE DAILY
Qty: 180 | Refills: 3 | Status: ACTIVE | COMMUNITY
Start: 2019-11-05 | End: 1900-01-01

## 2024-06-21 PROBLEM — E21.5 PARATHYROID ABNORMALITY: Status: ACTIVE | Noted: 2018-06-26

## 2024-06-21 PROBLEM — R79.89 LOW TSH LEVEL: Status: ACTIVE | Noted: 2023-08-29

## 2024-06-21 LAB
25(OH)D3 SERPL-MCNC: 30.1 NG/ML
ALBUMIN SERPL ELPH-MCNC: 4.7 G/DL
ALP BLD-CCNC: 119 U/L
ALT SERPL-CCNC: 16 U/L
ANION GAP SERPL CALC-SCNC: 16 MMOL/L
AST SERPL-CCNC: 17 U/L
BILIRUB SERPL-MCNC: 0.3 MG/DL
BUN SERPL-MCNC: 17 MG/DL
CALCIUM SERPL-MCNC: 10 MG/DL
CALCIUM SERPL-MCNC: 10 MG/DL
CHLORIDE SERPL-SCNC: 105 MMOL/L
CHOLEST SERPL-MCNC: 231 MG/DL
CO2 SERPL-SCNC: 21 MMOL/L
CREAT SERPL-MCNC: 0.71 MG/DL
CREAT SPEC-SCNC: 63 MG/DL
EGFR: 93 ML/MIN/1.73M2
ESTIMATED AVERAGE GLUCOSE: 143 MG/DL
GLUCOSE SERPL-MCNC: 111 MG/DL
HBA1C MFR BLD HPLC: 6.6 %
HDLC SERPL-MCNC: 82 MG/DL
LDLC SERPL CALC-MCNC: 118 MG/DL
MICROALBUMIN 24H UR DL<=1MG/L-MCNC: <1.2 MG/DL
MICROALBUMIN/CREAT 24H UR-RTO: NORMAL MG/G
NONHDLC SERPL-MCNC: 149 MG/DL
PARATHYROID HORMONE INTACT: 52 PG/ML
POTASSIUM SERPL-SCNC: 5 MMOL/L
PROT SERPL-MCNC: 7.4 G/DL
SODIUM SERPL-SCNC: 142 MMOL/L
T4 FREE SERPL-MCNC: 1 NG/DL
TRIGL SERPL-MCNC: 185 MG/DL
TSH SERPL-ACNC: 1.9 UIU/ML

## 2024-06-21 NOTE — END OF VISIT
[FreeTextEntry3] :  All medical record entries made by the Scribe were at my, Dr. Marc Uribe, direction and personally dictated by me on 06/20/2024. I have reviewed the chart and agree that the record accurately reflects my personal performance of the history, physical exam, assessment and plan. I have also personally directed, reviewed and agreed with the chart. [Time Spent: ___ minutes] : I have spent [unfilled] minutes of time on the encounter.

## 2024-06-21 NOTE — PHYSICAL EXAM
[No Acute Distress] : no acute distress [Normal Sclera/Conjunctiva] : normal sclera/conjunctiva [Normal Outer Ear/Nose] : the ears and nose were normal in appearance [Thyroid Not Enlarged] : the thyroid was not enlarged [Clear to Auscultation] : lungs were clear to auscultation bilaterally [Normal Rate] : heart rate was normal [No Edema] : no peripheral edema [Soft] : abdomen soft [Spine Straight] : spine straight [No Stigmata of Cushings Syndrome] : no stigmata of Cushings Syndrome [Normal Gait] : normal gait [Normal Strength/Tone] : muscle strength and tone were normal [No Rash] : no rash [Normal Reflexes] : deep tendon reflexes were 2+ and symmetric [No Tremors] : no tremors [Oriented x3] : oriented to person, place, and time [Kyphosis] : no kyphosis present [Acanthosis Nigricans] : no acanthosis nigricans [de-identified] : Nodularities palpated in the right lobe.

## 2024-06-21 NOTE — REASON FOR VISIT
[Follow - Up] : a follow-up visit [Thyroid nodule/ MNG] : thyroid nodule/ MNG [DM Type 2] : DM Type 2 [Hyperparathyroidism] : hyperparathyroidism [Other___] : [unfilled]

## 2024-06-21 NOTE — HISTORY OF PRESENT ILLNESS
[FreeTextEntry1] : 68 year old F pt, with Hx of Thyroid Nodule noted , T2DM, and increased iPTH, self- referred presents today to establish endocrine care.  Other PMHx:  Kidney Stones, Early menopause. No PMHx: bone fractures. PSHx: Cholecystomy, tubal ligation, ,  FHx: Mother: T2DM, Father: Stomach Cancer, Siblings: 9 Sisters and Brother: No thyroid disorders, no thyroid Cancer.  SHx: No etOH, Non smoker  Menarche age 16, Menopause age 29. 4 kids youngest is 41     2023  Pt  Is here for thyroid evaluation accompanied by her son. She states she needs a checkup as it has been 4 yrs since her last visit. Pt endorses feeling more tired than usual for the past few months. She was dx with sciatica that limited her movement. Pt has seen urogynecologist for urinary incontinence.   2023 Pt did not have any questions prior to the initiation of the telehealth visit.  She presents today for lab reports and recommendations.  Pt feels well with no complaints.   2024  Pt has POCT 105, /64 and BMI 25/15. She lost 4 lbs in 11 months. CC: "I'm alright." 18 Uptake scan at 24 hours: 26 %. Thyroid US: nodule: left lobe 0.6 cm Pt reports no change since her last visit. She follows up with her OBGYN and suffers from sciatica. Pt continues Metformin and ran out of statins. Pt is focused on improving her health.   Current Medications: Metformin 850 mg bid, Atorvastatin 20 mg qd, vit D 02646 IU, calcium Medication modified/added this visit: vit D 34516 IU

## 2024-06-28 ENCOUNTER — OUTPATIENT (OUTPATIENT)
Dept: OUTPATIENT SERVICES | Facility: HOSPITAL | Age: 69
LOS: 1 days | End: 2024-06-28
Payer: MEDICARE

## 2024-06-28 ENCOUNTER — APPOINTMENT (OUTPATIENT)
Dept: RADIOLOGY | Facility: CLINIC | Age: 69
End: 2024-06-28
Payer: MEDICARE

## 2024-06-28 DIAGNOSIS — Z78.0 ASYMPTOMATIC MENOPAUSAL STATE: ICD-10-CM

## 2024-06-28 PROCEDURE — 77080 DXA BONE DENSITY AXIAL: CPT

## 2024-06-28 PROCEDURE — 77080 DXA BONE DENSITY AXIAL: CPT | Mod: 26

## 2024-11-12 ENCOUNTER — APPOINTMENT (OUTPATIENT)
Dept: ENDOCRINOLOGY | Facility: CLINIC | Age: 69
End: 2024-11-12
Payer: MEDICARE

## 2024-11-12 VITALS
SYSTOLIC BLOOD PRESSURE: 142 MMHG | HEART RATE: 71 BPM | WEIGHT: 140 LBS | BODY MASS INDEX: 24.8 KG/M2 | DIASTOLIC BLOOD PRESSURE: 71 MMHG

## 2024-11-12 DIAGNOSIS — R79.89 OTHER SPECIFIED ABNORMAL FINDINGS OF BLOOD CHEMISTRY: ICD-10-CM

## 2024-11-12 DIAGNOSIS — E11.65 TYPE 2 DIABETES MELLITUS WITH HYPERGLYCEMIA: ICD-10-CM

## 2024-11-12 DIAGNOSIS — M81.0 AGE-RELATED OSTEOPOROSIS W/OUT CURRENT PATHOLOGICAL FRACTURE: ICD-10-CM

## 2024-11-12 DIAGNOSIS — E04.1 NONTOXIC SINGLE THYROID NODULE: ICD-10-CM

## 2024-11-12 PROCEDURE — 99215 OFFICE O/P EST HI 40 MIN: CPT

## 2024-11-12 PROCEDURE — G2211 COMPLEX E/M VISIT ADD ON: CPT

## 2024-11-12 RX ORDER — ALENDRONATE SODIUM 70 MG/1
70 TABLET ORAL
Qty: 4 | Refills: 7 | Status: ACTIVE | COMMUNITY
Start: 2024-11-12 | End: 1900-01-01

## 2024-11-22 ENCOUNTER — APPOINTMENT (OUTPATIENT)
Dept: OTOLARYNGOLOGY | Facility: CLINIC | Age: 69
End: 2024-11-22
Payer: MEDICARE

## 2024-11-22 ENCOUNTER — NON-APPOINTMENT (OUTPATIENT)
Age: 69
End: 2024-11-22

## 2024-11-22 VITALS
WEIGHT: 147 LBS | BODY MASS INDEX: 28.86 KG/M2 | OXYGEN SATURATION: 97 % | SYSTOLIC BLOOD PRESSURE: 147 MMHG | HEART RATE: 80 BPM | DIASTOLIC BLOOD PRESSURE: 62 MMHG | TEMPERATURE: 98.6 F | HEIGHT: 60 IN

## 2024-11-22 DIAGNOSIS — H69.93 UNSPECIFIED EUSTACHIAN TUBE DISORDER, BILATERAL: ICD-10-CM

## 2024-11-22 DIAGNOSIS — H90.3 SENSORINEURAL HEARING LOSS, BILATERAL: ICD-10-CM

## 2024-11-22 DIAGNOSIS — H93.19 TINNITUS, UNSPECIFIED EAR: ICD-10-CM

## 2024-11-22 DIAGNOSIS — Z78.9 OTHER SPECIFIED HEALTH STATUS: ICD-10-CM

## 2024-11-22 DIAGNOSIS — H69.90 UNSPECIFIED EUSTACHIAN TUBE DISORDER, UNSPECIFIED EAR: ICD-10-CM

## 2024-11-22 DIAGNOSIS — Z80.9 FAMILY HISTORY OF MALIGNANT NEOPLASM, UNSPECIFIED: ICD-10-CM

## 2024-11-22 DIAGNOSIS — H93.13 TINNITUS, BILATERAL: ICD-10-CM

## 2024-11-22 DIAGNOSIS — H90.42 SENSORINEURAL HEARING LOSS, UNILATERAL, LEFT EAR, WITH UNRESTRICTED HEARING ON THE CONTRALATERAL SIDE: ICD-10-CM

## 2024-11-22 DIAGNOSIS — Z83.3 FAMILY HISTORY OF DIABETES MELLITUS: ICD-10-CM

## 2024-11-22 PROCEDURE — 99203 OFFICE O/P NEW LOW 30 MIN: CPT | Mod: 25

## 2024-11-22 PROCEDURE — 92567 TYMPANOMETRY: CPT

## 2024-11-22 PROCEDURE — 31231 NASAL ENDOSCOPY DX: CPT

## 2024-11-22 PROCEDURE — 92557 COMPREHENSIVE HEARING TEST: CPT

## 2024-11-23 PROBLEM — H93.13 TINNITUS, BILATERAL: Status: ACTIVE | Noted: 2024-11-23

## 2024-11-23 PROBLEM — H69.90 ACUTE DYSFUNCTION OF EUSTACHIAN TUBE, UNSPECIFIED LATERALITY: Status: ACTIVE | Noted: 2024-11-23

## 2024-11-23 PROBLEM — H69.93 EUSTACHIAN TUBE DISORDER, BILATERAL: Status: ACTIVE | Noted: 2024-11-23

## 2024-11-23 PROBLEM — H90.42 SENSORINEURAL HEARING LOSS (SNHL) OF LEFT EAR WITH UNRESTRICTED HEARING OF RIGHT EAR: Status: ACTIVE | Noted: 2024-11-23

## 2024-11-23 LAB
25(OH)D3 SERPL-MCNC: 30.4 NG/ML
ANION GAP SERPL CALC-SCNC: 14 MMOL/L
BUN SERPL-MCNC: 15 MG/DL
CALCIUM SERPL-MCNC: 9.7 MG/DL
CHLORIDE SERPL-SCNC: 102 MMOL/L
CHOLEST SERPL-MCNC: 191 MG/DL
CO2 SERPL-SCNC: 26 MMOL/L
CREAT SERPL-MCNC: 0.69 MG/DL
CREAT SPEC-SCNC: 106 MG/DL
EGFR: 94 ML/MIN/1.73M2
ESTIMATED AVERAGE GLUCOSE: 134 MG/DL
GLUCOSE BLDC GLUCOMTR-MCNC: 70
GLUCOSE SERPL-MCNC: 94 MG/DL
HBA1C MFR BLD HPLC: 6.3 %
HBA1C MFR BLD HPLC: 6.5
HDLC SERPL-MCNC: 119 MG/DL
LDLC SERPL CALC-MCNC: 60 MG/DL
MICROALBUMIN 24H UR DL<=1MG/L-MCNC: <1.2 MG/DL
MICROALBUMIN/CREAT 24H UR-RTO: NORMAL MG/G
NONHDLC SERPL-MCNC: 72 MG/DL
POTASSIUM SERPL-SCNC: 4 MMOL/L
SODIUM SERPL-SCNC: 142 MMOL/L
TRIGL SERPL-MCNC: 65 MG/DL
TSH SERPL-ACNC: 1.87 UIU/ML

## 2024-12-03 ENCOUNTER — APPOINTMENT (OUTPATIENT)
Dept: OTOLARYNGOLOGY | Facility: CLINIC | Age: 69
End: 2024-12-03

## 2025-07-14 ENCOUNTER — APPOINTMENT (OUTPATIENT)
Dept: ENDOCRINOLOGY | Facility: CLINIC | Age: 70
End: 2025-07-14